# Patient Record
Sex: FEMALE | Race: WHITE | Employment: FULL TIME | URBAN - METROPOLITAN AREA
[De-identification: names, ages, dates, MRNs, and addresses within clinical notes are randomized per-mention and may not be internally consistent; named-entity substitution may affect disease eponyms.]

---

## 2019-04-18 PROCEDURE — 96375 TX/PRO/DX INJ NEW DRUG ADDON: CPT

## 2019-04-18 PROCEDURE — 96361 HYDRATE IV INFUSION ADD-ON: CPT

## 2019-04-18 PROCEDURE — 96365 THER/PROPH/DIAG IV INF INIT: CPT | Mod: 59

## 2019-04-18 PROCEDURE — 99285 EMERGENCY DEPT VISIT HI MDM: CPT | Mod: 25

## 2019-04-19 ENCOUNTER — HOSPITAL ENCOUNTER (OUTPATIENT)
Facility: CLINIC | Age: 34
Setting detail: OBSERVATION
Discharge: HOME | End: 2019-04-20
Attending: EMERGENCY MEDICINE | Admitting: INTERNAL MEDICINE

## 2019-04-19 ENCOUNTER — APPOINTMENT (OUTPATIENT)
Dept: SURGERY | Facility: PHYSICIAN GROUP | Age: 34
End: 2019-04-19

## 2019-04-19 ENCOUNTER — APPOINTMENT (OUTPATIENT)
Dept: ULTRASOUND IMAGING | Facility: CLINIC | Age: 34
End: 2019-04-19
Attending: EMERGENCY MEDICINE

## 2019-04-19 ENCOUNTER — ANESTHESIA EVENT (OUTPATIENT)
Dept: SURGERY | Facility: CLINIC | Age: 34
End: 2019-04-19

## 2019-04-19 ENCOUNTER — ANESTHESIA (OUTPATIENT)
Dept: SURGERY | Facility: CLINIC | Age: 34
End: 2019-04-19

## 2019-04-19 ENCOUNTER — APPOINTMENT (OUTPATIENT)
Dept: GENERAL RADIOLOGY | Facility: CLINIC | Age: 34
End: 2019-04-19
Attending: EMERGENCY MEDICINE

## 2019-04-19 DIAGNOSIS — K81.0 ACUTE CHOLECYSTITIS: ICD-10-CM

## 2019-04-19 LAB
ALBUMIN SERPL-MCNC: 4 G/DL (ref 3.4–5)
ALBUMIN UR-MCNC: NEGATIVE MG/DL
ALP SERPL-CCNC: 59 U/L (ref 40–150)
ALT SERPL W P-5'-P-CCNC: 18 U/L (ref 0–50)
ANION GAP SERPL CALCULATED.3IONS-SCNC: 6 MMOL/L (ref 3–14)
APPEARANCE UR: ABNORMAL
AST SERPL W P-5'-P-CCNC: 13 U/L (ref 0–45)
B-HCG SERPL-ACNC: <1 IU/L (ref 0–5)
BACTERIA #/AREA URNS HPF: ABNORMAL /HPF
BASOPHILS # BLD AUTO: 0.1 10E9/L (ref 0–0.2)
BASOPHILS NFR BLD AUTO: 0.5 %
BILIRUB SERPL-MCNC: 0.2 MG/DL (ref 0.2–1.3)
BILIRUB UR QL STRIP: NEGATIVE
BUN SERPL-MCNC: 10 MG/DL (ref 7–30)
CALCIUM SERPL-MCNC: 8.1 MG/DL (ref 8.5–10.1)
CHLORIDE SERPL-SCNC: 104 MMOL/L (ref 94–109)
CO2 SERPL-SCNC: 27 MMOL/L (ref 20–32)
COLOR UR AUTO: ABNORMAL
CREAT SERPL-MCNC: 0.78 MG/DL (ref 0.52–1.04)
DIFFERENTIAL METHOD BLD: ABNORMAL
EOSINOPHIL # BLD AUTO: 0.1 10E9/L (ref 0–0.7)
EOSINOPHIL NFR BLD AUTO: 0.8 %
ERYTHROCYTE [DISTWIDTH] IN BLOOD BY AUTOMATED COUNT: 12 % (ref 10–15)
GFR SERPL CREATININE-BSD FRML MDRD: >90 ML/MIN/{1.73_M2}
GLUCOSE SERPL-MCNC: 97 MG/DL (ref 70–99)
GLUCOSE UR STRIP-MCNC: NEGATIVE MG/DL
HCG UR QL: NEGATIVE
HCT VFR BLD AUTO: 42.4 % (ref 35–47)
HGB BLD-MCNC: 14.4 G/DL (ref 11.7–15.7)
HGB UR QL STRIP: NEGATIVE
IMM GRANULOCYTES # BLD: 0.1 10E9/L (ref 0–0.4)
IMM GRANULOCYTES NFR BLD: 0.5 %
KETONES UR STRIP-MCNC: NEGATIVE MG/DL
LEUKOCYTE ESTERASE UR QL STRIP: NEGATIVE
LIPASE SERPL-CCNC: 262 U/L (ref 73–393)
LYMPHOCYTES # BLD AUTO: 3.4 10E9/L (ref 0.8–5.3)
LYMPHOCYTES NFR BLD AUTO: 19.7 %
MCH RBC QN AUTO: 31.2 PG (ref 26.5–33)
MCHC RBC AUTO-ENTMCNC: 34 G/DL (ref 31.5–36.5)
MCV RBC AUTO: 92 FL (ref 78–100)
MONOCYTES # BLD AUTO: 0.7 10E9/L (ref 0–1.3)
MONOCYTES NFR BLD AUTO: 4.1 %
MUCOUS THREADS #/AREA URNS LPF: PRESENT /LPF
NEUTROPHILS # BLD AUTO: 12.7 10E9/L (ref 1.6–8.3)
NEUTROPHILS NFR BLD AUTO: 74.4 %
NITRATE UR QL: NEGATIVE
NRBC # BLD AUTO: 0 10*3/UL
NRBC BLD AUTO-RTO: 0 /100
PH UR STRIP: 7 PH (ref 5–7)
PLATELET # BLD AUTO: 422 10E9/L (ref 150–450)
POTASSIUM SERPL-SCNC: 4 MMOL/L (ref 3.4–5.3)
PROT SERPL-MCNC: 7.4 G/DL (ref 6.8–8.8)
RBC # BLD AUTO: 4.61 10E12/L (ref 3.8–5.2)
RBC #/AREA URNS AUTO: 2 /HPF (ref 0–2)
SODIUM SERPL-SCNC: 137 MMOL/L (ref 133–144)
SOURCE: ABNORMAL
SP GR UR STRIP: 1.02 (ref 1–1.03)
SQUAMOUS #/AREA URNS AUTO: 25 /HPF (ref 0–1)
UROBILINOGEN UR STRIP-MCNC: NORMAL MG/DL (ref 0–2)
WBC # BLD AUTO: 17.1 10E9/L (ref 4–11)
WBC #/AREA URNS AUTO: 4 /HPF (ref 0–5)

## 2019-04-19 PROCEDURE — 25000132 ZZH RX MED GY IP 250 OP 250 PS 637: Performed by: ANESTHESIOLOGY

## 2019-04-19 PROCEDURE — 81025 URINE PREGNANCY TEST: CPT | Performed by: EMERGENCY MEDICINE

## 2019-04-19 PROCEDURE — 80053 COMPREHEN METABOLIC PANEL: CPT | Performed by: EMERGENCY MEDICINE

## 2019-04-19 PROCEDURE — 25800030 ZZH RX IP 258 OP 636: Performed by: NURSE ANESTHETIST, CERTIFIED REGISTERED

## 2019-04-19 PROCEDURE — 84702 CHORIONIC GONADOTROPIN TEST: CPT | Performed by: EMERGENCY MEDICINE

## 2019-04-19 PROCEDURE — 25000125 ZZHC RX 250: Performed by: EMERGENCY MEDICINE

## 2019-04-19 PROCEDURE — 25000128 H RX IP 250 OP 636: Performed by: SURGERY

## 2019-04-19 PROCEDURE — 83690 ASSAY OF LIPASE: CPT | Performed by: EMERGENCY MEDICINE

## 2019-04-19 PROCEDURE — 25000128 H RX IP 250 OP 636: Performed by: NURSE ANESTHETIST, CERTIFIED REGISTERED

## 2019-04-19 PROCEDURE — 81001 URINALYSIS AUTO W/SCOPE: CPT | Performed by: EMERGENCY MEDICINE

## 2019-04-19 PROCEDURE — 99220 ZZC INITIAL OBSERVATION CARE,LEVL III: CPT | Performed by: INTERNAL MEDICINE

## 2019-04-19 PROCEDURE — 85025 COMPLETE CBC W/AUTO DIFF WBC: CPT | Performed by: EMERGENCY MEDICINE

## 2019-04-19 PROCEDURE — 88304 TISSUE EXAM BY PATHOLOGIST: CPT | Performed by: SURGERY

## 2019-04-19 PROCEDURE — 25000128 H RX IP 250 OP 636: Performed by: EMERGENCY MEDICINE

## 2019-04-19 PROCEDURE — 37000008 ZZH ANESTHESIA TECHNICAL FEE, 1ST 30 MIN: Performed by: SURGERY

## 2019-04-19 PROCEDURE — 25800030 ZZH RX IP 258 OP 636: Performed by: INTERNAL MEDICINE

## 2019-04-19 PROCEDURE — 71000027 ZZH RECOVERY PHASE 2 EACH 15 MINS: Performed by: SURGERY

## 2019-04-19 PROCEDURE — 25800030 ZZH RX IP 258 OP 636: Performed by: ANESTHESIOLOGY

## 2019-04-19 PROCEDURE — 99207 ZZC APP CREDIT; MD BILLING SHARED VISIT: CPT | Performed by: PHYSICIAN ASSISTANT

## 2019-04-19 PROCEDURE — 76705 ECHO EXAM OF ABDOMEN: CPT

## 2019-04-19 PROCEDURE — 96376 TX/PRO/DX INJ SAME DRUG ADON: CPT

## 2019-04-19 PROCEDURE — 25000128 H RX IP 250 OP 636: Performed by: ANESTHESIOLOGY

## 2019-04-19 PROCEDURE — 47562 LAPAROSCOPIC CHOLECYSTECTOMY: CPT | Performed by: SURGERY

## 2019-04-19 PROCEDURE — 37000009 ZZH ANESTHESIA TECHNICAL FEE, EACH ADDTL 15 MIN: Performed by: SURGERY

## 2019-04-19 PROCEDURE — 96366 THER/PROPH/DIAG IV INF ADDON: CPT

## 2019-04-19 PROCEDURE — 25000125 ZZHC RX 250: Performed by: NURSE ANESTHETIST, CERTIFIED REGISTERED

## 2019-04-19 PROCEDURE — 74018 RADEX ABDOMEN 1 VIEW: CPT

## 2019-04-19 PROCEDURE — 25000128 H RX IP 250 OP 636: Performed by: INTERNAL MEDICINE

## 2019-04-19 PROCEDURE — 25000125 ZZHC RX 250: Performed by: SURGERY

## 2019-04-19 PROCEDURE — 71000013 ZZH RECOVERY PHASE 1 LEVEL 1 EA ADDTL HR: Performed by: SURGERY

## 2019-04-19 PROCEDURE — 25000132 ZZH RX MED GY IP 250 OP 250 PS 637: Performed by: SURGERY

## 2019-04-19 PROCEDURE — G0378 HOSPITAL OBSERVATION PER HR: HCPCS

## 2019-04-19 PROCEDURE — 36000056 ZZH SURGERY LEVEL 3 1ST 30 MIN: Performed by: SURGERY

## 2019-04-19 PROCEDURE — 25800030 ZZH RX IP 258 OP 636: Performed by: PHYSICIAN ASSISTANT

## 2019-04-19 PROCEDURE — 47562 LAPAROSCOPIC CHOLECYSTECTOMY: CPT | Mod: AS | Performed by: PHYSICIAN ASSISTANT

## 2019-04-19 PROCEDURE — 40000306 ZZH STATISTIC PRE PROC ASSESS II: Performed by: SURGERY

## 2019-04-19 PROCEDURE — 25800025 ZZH RX 258: Performed by: SURGERY

## 2019-04-19 PROCEDURE — 27210794 ZZH OR GENERAL SUPPLY STERILE: Performed by: SURGERY

## 2019-04-19 PROCEDURE — 36000058 ZZH SURGERY LEVEL 3 EA 15 ADDTL MIN: Performed by: SURGERY

## 2019-04-19 PROCEDURE — 71000012 ZZH RECOVERY PHASE 1 LEVEL 1 FIRST HR: Performed by: SURGERY

## 2019-04-19 PROCEDURE — 25000132 ZZH RX MED GY IP 250 OP 250 PS 637: Performed by: EMERGENCY MEDICINE

## 2019-04-19 RX ORDER — CEFAZOLIN SODIUM 2 G/100ML
2 INJECTION, SOLUTION INTRAVENOUS
Status: DISCONTINUED | OUTPATIENT
Start: 2019-04-19 | End: 2019-04-19 | Stop reason: HOSPADM

## 2019-04-19 RX ORDER — HYDROMORPHONE HYDROCHLORIDE 1 MG/ML
0.5 INJECTION, SOLUTION INTRAMUSCULAR; INTRAVENOUS; SUBCUTANEOUS ONCE
Status: COMPLETED | OUTPATIENT
Start: 2019-04-19 | End: 2019-04-19

## 2019-04-19 RX ORDER — SODIUM CHLORIDE 9 MG/ML
INJECTION, SOLUTION INTRAVENOUS CONTINUOUS
Status: DISCONTINUED | OUTPATIENT
Start: 2019-04-19 | End: 2019-04-19

## 2019-04-19 RX ORDER — FENTANYL CITRATE 50 UG/ML
100 INJECTION, SOLUTION INTRAMUSCULAR; INTRAVENOUS ONCE
Status: COMPLETED | OUTPATIENT
Start: 2019-04-19 | End: 2019-04-19

## 2019-04-19 RX ORDER — TRAMADOL HYDROCHLORIDE 50 MG/1
50-100 TABLET ORAL EVERY 4 HOURS PRN
Status: DISCONTINUED | OUTPATIENT
Start: 2019-04-19 | End: 2019-04-20 | Stop reason: HOSPADM

## 2019-04-19 RX ORDER — KETOROLAC TROMETHAMINE 30 MG/ML
30 INJECTION, SOLUTION INTRAMUSCULAR; INTRAVENOUS EVERY 6 HOURS PRN
Status: DISCONTINUED | OUTPATIENT
Start: 2019-04-19 | End: 2019-04-19 | Stop reason: HOSPADM

## 2019-04-19 RX ORDER — DEXAMETHASONE SODIUM PHOSPHATE 4 MG/ML
INJECTION, SOLUTION INTRA-ARTICULAR; INTRALESIONAL; INTRAMUSCULAR; INTRAVENOUS; SOFT TISSUE PRN
Status: DISCONTINUED | OUTPATIENT
Start: 2019-04-19 | End: 2019-04-19

## 2019-04-19 RX ORDER — TRAMADOL HYDROCHLORIDE 50 MG/1
50 TABLET ORAL ONCE
Status: COMPLETED | OUTPATIENT
Start: 2019-04-19 | End: 2019-04-19

## 2019-04-19 RX ORDER — AMPICILLIN AND SULBACTAM 2; 1 G/1; G/1
3 INJECTION, POWDER, FOR SOLUTION INTRAMUSCULAR; INTRAVENOUS EVERY 6 HOURS
Status: DISCONTINUED | OUTPATIENT
Start: 2019-04-19 | End: 2019-04-20 | Stop reason: HOSPADM

## 2019-04-19 RX ORDER — LIDOCAINE 40 MG/G
CREAM TOPICAL
Status: DISCONTINUED | OUTPATIENT
Start: 2019-04-19 | End: 2019-04-19 | Stop reason: HOSPADM

## 2019-04-19 RX ORDER — ONDANSETRON 2 MG/ML
4 INJECTION INTRAMUSCULAR; INTRAVENOUS EVERY 6 HOURS PRN
Status: DISCONTINUED | OUTPATIENT
Start: 2019-04-19 | End: 2019-04-20 | Stop reason: HOSPADM

## 2019-04-19 RX ORDER — HYDRALAZINE HYDROCHLORIDE 20 MG/ML
2.5-5 INJECTION INTRAMUSCULAR; INTRAVENOUS EVERY 10 MIN PRN
Status: DISCONTINUED | OUTPATIENT
Start: 2019-04-19 | End: 2019-04-19 | Stop reason: HOSPADM

## 2019-04-19 RX ORDER — ONDANSETRON 4 MG/1
4 TABLET, ORALLY DISINTEGRATING ORAL EVERY 30 MIN PRN
Status: DISCONTINUED | OUTPATIENT
Start: 2019-04-19 | End: 2019-04-19 | Stop reason: HOSPADM

## 2019-04-19 RX ORDER — ACETAMINOPHEN 650 MG/1
650 SUPPOSITORY RECTAL EVERY 4 HOURS PRN
Status: DISCONTINUED | OUTPATIENT
Start: 2019-04-19 | End: 2019-04-20 | Stop reason: HOSPADM

## 2019-04-19 RX ORDER — ONDANSETRON 2 MG/ML
4 INJECTION INTRAMUSCULAR; INTRAVENOUS ONCE
Status: COMPLETED | OUTPATIENT
Start: 2019-04-19 | End: 2019-04-19

## 2019-04-19 RX ORDER — HYDROXYZINE HYDROCHLORIDE 10 MG/1
10 TABLET, FILM COATED ORAL ONCE
Status: COMPLETED | OUTPATIENT
Start: 2019-04-19 | End: 2019-04-19

## 2019-04-19 RX ORDER — NEOSTIGMINE METHYLSULFATE 1 MG/ML
VIAL (ML) INJECTION PRN
Status: DISCONTINUED | OUTPATIENT
Start: 2019-04-19 | End: 2019-04-19

## 2019-04-19 RX ORDER — NALOXONE HYDROCHLORIDE 0.4 MG/ML
.1-.4 INJECTION, SOLUTION INTRAMUSCULAR; INTRAVENOUS; SUBCUTANEOUS
Status: DISCONTINUED | OUTPATIENT
Start: 2019-04-19 | End: 2019-04-20 | Stop reason: HOSPADM

## 2019-04-19 RX ORDER — TRAMADOL HYDROCHLORIDE 50 MG/1
50 TABLET ORAL EVERY 6 HOURS PRN
Qty: 20 TABLET | Refills: 0 | Status: SHIPPED | OUTPATIENT
Start: 2019-04-19 | End: 2019-04-22

## 2019-04-19 RX ORDER — DIMENHYDRINATE 50 MG/ML
25 INJECTION, SOLUTION INTRAMUSCULAR; INTRAVENOUS
Status: DISCONTINUED | OUTPATIENT
Start: 2019-04-19 | End: 2019-04-19 | Stop reason: HOSPADM

## 2019-04-19 RX ORDER — ACETAMINOPHEN 325 MG/1
650 TABLET ORAL EVERY 4 HOURS PRN
Status: DISCONTINUED | OUTPATIENT
Start: 2019-04-19 | End: 2019-04-20 | Stop reason: HOSPADM

## 2019-04-19 RX ORDER — BUPIVACAINE HYDROCHLORIDE AND EPINEPHRINE 5; 5 MG/ML; UG/ML
INJECTION, SOLUTION PERINEURAL PRN
Status: DISCONTINUED | OUTPATIENT
Start: 2019-04-19 | End: 2019-04-19 | Stop reason: HOSPADM

## 2019-04-19 RX ORDER — GLYCOPYRROLATE 0.2 MG/ML
INJECTION, SOLUTION INTRAMUSCULAR; INTRAVENOUS PRN
Status: DISCONTINUED | OUTPATIENT
Start: 2019-04-19 | End: 2019-04-19

## 2019-04-19 RX ORDER — HYDROMORPHONE HYDROCHLORIDE 1 MG/ML
0.5 INJECTION, SOLUTION INTRAMUSCULAR; INTRAVENOUS; SUBCUTANEOUS
Status: DISCONTINUED | OUTPATIENT
Start: 2019-04-19 | End: 2019-04-20 | Stop reason: HOSPADM

## 2019-04-19 RX ORDER — METOCLOPRAMIDE HYDROCHLORIDE 5 MG/ML
10 INJECTION INTRAMUSCULAR; INTRAVENOUS EVERY 6 HOURS PRN
Status: DISCONTINUED | OUTPATIENT
Start: 2019-04-19 | End: 2019-04-19 | Stop reason: HOSPADM

## 2019-04-19 RX ORDER — FENTANYL CITRATE 50 UG/ML
25-50 INJECTION, SOLUTION INTRAMUSCULAR; INTRAVENOUS
Status: DISCONTINUED | OUTPATIENT
Start: 2019-04-19 | End: 2019-04-19 | Stop reason: HOSPADM

## 2019-04-19 RX ORDER — LIDOCAINE HYDROCHLORIDE 10 MG/ML
INJECTION, SOLUTION INFILTRATION; PERINEURAL PRN
Status: DISCONTINUED | OUTPATIENT
Start: 2019-04-19 | End: 2019-04-19

## 2019-04-19 RX ORDER — MEPERIDINE HYDROCHLORIDE 50 MG/ML
12.5 INJECTION INTRAMUSCULAR; INTRAVENOUS; SUBCUTANEOUS
Status: DISCONTINUED | OUTPATIENT
Start: 2019-04-19 | End: 2019-04-19 | Stop reason: HOSPADM

## 2019-04-19 RX ORDER — ONDANSETRON 2 MG/ML
INJECTION INTRAMUSCULAR; INTRAVENOUS PRN
Status: DISCONTINUED | OUTPATIENT
Start: 2019-04-19 | End: 2019-04-19

## 2019-04-19 RX ORDER — AMPICILLIN AND SULBACTAM 2; 1 G/1; G/1
3 INJECTION, POWDER, FOR SOLUTION INTRAMUSCULAR; INTRAVENOUS EVERY 6 HOURS
Status: DISCONTINUED | OUTPATIENT
Start: 2019-04-19 | End: 2019-04-19

## 2019-04-19 RX ORDER — FENTANYL CITRATE 50 UG/ML
INJECTION, SOLUTION INTRAMUSCULAR; INTRAVENOUS PRN
Status: DISCONTINUED | OUTPATIENT
Start: 2019-04-19 | End: 2019-04-19

## 2019-04-19 RX ORDER — PROPOFOL 10 MG/ML
INJECTION, EMULSION INTRAVENOUS PRN
Status: DISCONTINUED | OUTPATIENT
Start: 2019-04-19 | End: 2019-04-19

## 2019-04-19 RX ORDER — ACETAMINOPHEN 325 MG/1
650 TABLET ORAL
Status: DISCONTINUED | OUTPATIENT
Start: 2019-04-19 | End: 2019-04-19

## 2019-04-19 RX ORDER — SODIUM CHLORIDE 9 MG/ML
1000 INJECTION, SOLUTION INTRAVENOUS CONTINUOUS
Status: DISCONTINUED | OUTPATIENT
Start: 2019-04-19 | End: 2019-04-19

## 2019-04-19 RX ORDER — LABETALOL 20 MG/4 ML (5 MG/ML) INTRAVENOUS SYRINGE
10
Status: DISCONTINUED | OUTPATIENT
Start: 2019-04-19 | End: 2019-04-19 | Stop reason: HOSPADM

## 2019-04-19 RX ORDER — SODIUM CHLORIDE, SODIUM LACTATE, POTASSIUM CHLORIDE, CALCIUM CHLORIDE 600; 310; 30; 20 MG/100ML; MG/100ML; MG/100ML; MG/100ML
INJECTION, SOLUTION INTRAVENOUS CONTINUOUS
Status: DISCONTINUED | OUTPATIENT
Start: 2019-04-19 | End: 2019-04-19 | Stop reason: HOSPADM

## 2019-04-19 RX ORDER — AMPICILLIN AND SULBACTAM 2; 1 G/1; G/1
3 INJECTION, POWDER, FOR SOLUTION INTRAMUSCULAR; INTRAVENOUS ONCE
Status: COMPLETED | OUTPATIENT
Start: 2019-04-19 | End: 2019-04-19

## 2019-04-19 RX ORDER — AMOXICILLIN 250 MG
1-2 CAPSULE ORAL 2 TIMES DAILY
Qty: 30 TABLET | Refills: 0 | Status: SHIPPED | OUTPATIENT
Start: 2019-04-19

## 2019-04-19 RX ORDER — SODIUM CHLORIDE 9 MG/ML
INJECTION, SOLUTION INTRAVENOUS CONTINUOUS
Status: DISCONTINUED | OUTPATIENT
Start: 2019-04-20 | End: 2019-04-20 | Stop reason: HOSPADM

## 2019-04-19 RX ORDER — ONDANSETRON 4 MG/1
4 TABLET, ORALLY DISINTEGRATING ORAL EVERY 6 HOURS PRN
Status: DISCONTINUED | OUTPATIENT
Start: 2019-04-19 | End: 2019-04-20 | Stop reason: HOSPADM

## 2019-04-19 RX ORDER — ONDANSETRON 2 MG/ML
4 INJECTION INTRAMUSCULAR; INTRAVENOUS EVERY 30 MIN PRN
Status: DISCONTINUED | OUTPATIENT
Start: 2019-04-19 | End: 2019-04-19 | Stop reason: HOSPADM

## 2019-04-19 RX ORDER — DEXAMETHASONE SODIUM PHOSPHATE 4 MG/ML
4 INJECTION, SOLUTION INTRA-ARTICULAR; INTRALESIONAL; INTRAMUSCULAR; INTRAVENOUS; SOFT TISSUE EVERY 10 MIN PRN
Status: DISCONTINUED | OUTPATIENT
Start: 2019-04-19 | End: 2019-04-19 | Stop reason: HOSPADM

## 2019-04-19 RX ORDER — CEFAZOLIN SODIUM 1 G/50ML
1 INJECTION, SOLUTION INTRAVENOUS SEE ADMIN INSTRUCTIONS
Status: DISCONTINUED | OUTPATIENT
Start: 2019-04-19 | End: 2019-04-19 | Stop reason: HOSPADM

## 2019-04-19 RX ORDER — METOCLOPRAMIDE 10 MG/1
10 TABLET ORAL EVERY 6 HOURS PRN
Status: DISCONTINUED | OUTPATIENT
Start: 2019-04-19 | End: 2019-04-19 | Stop reason: HOSPADM

## 2019-04-19 RX ORDER — SODIUM CHLORIDE, SODIUM LACTATE, POTASSIUM CHLORIDE, CALCIUM CHLORIDE 600; 310; 30; 20 MG/100ML; MG/100ML; MG/100ML; MG/100ML
INJECTION, SOLUTION INTRAVENOUS CONTINUOUS PRN
Status: DISCONTINUED | OUTPATIENT
Start: 2019-04-19 | End: 2019-04-19

## 2019-04-19 RX ORDER — HYDROMORPHONE HYDROCHLORIDE 1 MG/ML
.3-.5 INJECTION, SOLUTION INTRAMUSCULAR; INTRAVENOUS; SUBCUTANEOUS EVERY 10 MIN PRN
Status: DISCONTINUED | OUTPATIENT
Start: 2019-04-19 | End: 2019-04-19 | Stop reason: HOSPADM

## 2019-04-19 RX ORDER — NALOXONE HYDROCHLORIDE 0.4 MG/ML
.1-.4 INJECTION, SOLUTION INTRAMUSCULAR; INTRAVENOUS; SUBCUTANEOUS
Status: DISCONTINUED | OUTPATIENT
Start: 2019-04-19 | End: 2019-04-19 | Stop reason: HOSPADM

## 2019-04-19 RX ADMIN — SODIUM CHLORIDE: 9 INJECTION, SOLUTION INTRAVENOUS at 04:11

## 2019-04-19 RX ADMIN — FENTANYL CITRATE 50 MCG: 50 INJECTION INTRAMUSCULAR; INTRAVENOUS at 13:46

## 2019-04-19 RX ADMIN — FAMOTIDINE 20 MG: 10 INJECTION, SOLUTION INTRAVENOUS at 01:11

## 2019-04-19 RX ADMIN — LIDOCAINE HYDROCHLORIDE 30 ML: 20 SOLUTION ORAL; TOPICAL at 01:16

## 2019-04-19 RX ADMIN — Medication 0.5 MG: at 03:16

## 2019-04-19 RX ADMIN — Medication 0.5 MG: at 04:31

## 2019-04-19 RX ADMIN — SODIUM CHLORIDE, POTASSIUM CHLORIDE, SODIUM LACTATE AND CALCIUM CHLORIDE: 600; 310; 30; 20 INJECTION, SOLUTION INTRAVENOUS at 11:13

## 2019-04-19 RX ADMIN — FENTANYL CITRATE 100 MCG: 50 INJECTION, SOLUTION INTRAMUSCULAR; INTRAVENOUS at 11:03

## 2019-04-19 RX ADMIN — SODIUM CHLORIDE 1000 ML: 9 INJECTION, SOLUTION INTRAVENOUS at 00:57

## 2019-04-19 RX ADMIN — AMPICILLIN SODIUM AND SULBACTAM SODIUM 3 G: 2; 1 INJECTION, POWDER, FOR SOLUTION INTRAMUSCULAR; INTRAVENOUS at 03:16

## 2019-04-19 RX ADMIN — DEXAMETHASONE SODIUM PHOSPHATE 4 MG: 4 INJECTION, SOLUTION INTRA-ARTICULAR; INTRALESIONAL; INTRAMUSCULAR; INTRAVENOUS; SOFT TISSUE at 11:22

## 2019-04-19 RX ADMIN — SODIUM CHLORIDE, POTASSIUM CHLORIDE, SODIUM LACTATE AND CALCIUM CHLORIDE: 600; 310; 30; 20 INJECTION, SOLUTION INTRAVENOUS at 14:02

## 2019-04-19 RX ADMIN — HYDROMORPHONE HYDROCHLORIDE 1 MG: 1 INJECTION, SOLUTION INTRAMUSCULAR; INTRAVENOUS; SUBCUTANEOUS at 11:32

## 2019-04-19 RX ADMIN — LIDOCAINE HYDROCHLORIDE 50 MG: 10 INJECTION, SOLUTION INFILTRATION; PERINEURAL at 11:22

## 2019-04-19 RX ADMIN — PROCHLORPERAZINE EDISYLATE 10 MG: 5 INJECTION INTRAMUSCULAR; INTRAVENOUS at 14:09

## 2019-04-19 RX ADMIN — TRAMADOL HYDROCHLORIDE 50 MG: 50 TABLET, COATED ORAL at 14:02

## 2019-04-19 RX ADMIN — ONDANSETRON 4 MG: 2 INJECTION INTRAMUSCULAR; INTRAVENOUS at 12:16

## 2019-04-19 RX ADMIN — ROCURONIUM BROMIDE 50 MG: 10 INJECTION INTRAVENOUS at 11:22

## 2019-04-19 RX ADMIN — Medication 3 MG: at 12:23

## 2019-04-19 RX ADMIN — GLYCOPYRROLATE 0.4 MG: 0.2 INJECTION, SOLUTION INTRAMUSCULAR; INTRAVENOUS at 12:23

## 2019-04-19 RX ADMIN — HYDROXYZINE HYDROCHLORIDE 10 MG: 10 TABLET ORAL at 14:38

## 2019-04-19 RX ADMIN — PROPOFOL 170 MG: 10 INJECTION, EMULSION INTRAVENOUS at 11:22

## 2019-04-19 RX ADMIN — FENTANYL CITRATE 100 MCG: 50 INJECTION, SOLUTION INTRAMUSCULAR; INTRAVENOUS at 11:22

## 2019-04-19 RX ADMIN — TRAMADOL HYDROCHLORIDE 50 MG: 50 TABLET, COATED ORAL at 21:28

## 2019-04-19 RX ADMIN — ACETAMINOPHEN 650 MG: 325 TABLET, FILM COATED ORAL at 21:28

## 2019-04-19 RX ADMIN — AMPICILLIN AND SULBACTAM 3 G: 2; 1 INJECTION, POWDER, FOR SOLUTION INTRAMUSCULAR; INTRAVENOUS at 19:01

## 2019-04-19 RX ADMIN — AMPICILLIN AND SULBACTAM 3 G: 2; 1 INJECTION, POWDER, FOR SOLUTION INTRAMUSCULAR; INTRAVENOUS at 23:45

## 2019-04-19 RX ADMIN — AMPICILLIN AND SULBACTAM 3 G: 2; 1 INJECTION, POWDER, FOR SOLUTION INTRAMUSCULAR; INTRAVENOUS at 09:01

## 2019-04-19 RX ADMIN — FENTANYL CITRATE 50 MCG: 50 INJECTION, SOLUTION INTRAMUSCULAR; INTRAVENOUS at 11:49

## 2019-04-19 RX ADMIN — Medication 0.5 MG: at 13:09

## 2019-04-19 RX ADMIN — ONDANSETRON 4 MG: 2 INJECTION INTRAMUSCULAR; INTRAVENOUS at 13:18

## 2019-04-19 RX ADMIN — GLYCOPYRROLATE 0.2 MG: 0.2 INJECTION, SOLUTION INTRAMUSCULAR; INTRAVENOUS at 11:22

## 2019-04-19 RX ADMIN — ONDANSETRON 4 MG: 2 INJECTION INTRAMUSCULAR; INTRAVENOUS at 01:11

## 2019-04-19 RX ADMIN — FENTANYL CITRATE 50 MCG: 50 INJECTION INTRAMUSCULAR; INTRAVENOUS at 13:32

## 2019-04-19 RX ADMIN — SODIUM CHLORIDE: 900 INJECTION INTRAVENOUS at 23:44

## 2019-04-19 RX ADMIN — Medication 0.5 MG: at 09:01

## 2019-04-19 ASSESSMENT — ENCOUNTER SYMPTOMS
CONSTIPATION: 1
DYSURIA: 0
DIARRHEA: 0
ABDOMINAL PAIN: 1
BACK PAIN: 1
FEVER: 0
NAUSEA: 1
VOMITING: 1

## 2019-04-19 ASSESSMENT — MIFFLIN-ST. JEOR
SCORE: 1316.89
SCORE: 1317.35
SCORE: 1319.16

## 2019-04-19 NOTE — CONSULTS
General Surgery Consultation    Nikkie Vivas MRN# [id number]   Age: 33 year old YOB: 1985     Date of Admission:  4/19/2019    Reason for consult:            Cholecystitis       Requesting physician:            Dr Radha Starkey                Assessment and Plan:   Assessment:   Nikkie Vivas is a 33 year old healthy female with acute cholecystitis. Multiple days of epigastric pain, n/v, ultrasound with a large nonmobile stone in the gallbladder neck and leukocytosis to 17,000 and normal LFTs      Plan:   I have offered the patient a laparoscopic cholecystectomy, today.    We have discussed the indication, alternatives, risks and expected recovery.  Specifically we have discussed incisions, scarring, postoperative infections, anesthesia, bleeding, blood transfusion, open conversion, common bile duct injury, injury to intra-abdominal organs, adhesions leading to bowel obstruction, retained common bile duct stone, bile leak, DVT, PE, hernia, post cholecystectomy diarrhea, recovery, postoperative dietary restrictions and physical limitations.  We have discussed the recommended interventions and treatments for these complications.  All questions have been answered to the best of my ability.    She elects to proceed with surgery.   Will plan to discharge to home after surgery today unless complications arise.                 Chief Complaint:   Cholecystitis     History is obtained from the patient.         History of Present Illness:   Nikkie Vivas is a 33 year old  female who presents with abdominal pain for multiple days. Pain is in the epigastrum with radiation to the midback and is constant. Slowly worsening over the last few days. Has been nauseated and vomited. Eating makes it worse. No fevers, but has felt hot and dizzy. Tried taking alkaselzer, peptobismol and tylenol at home with no relief. Has had very mild epigastric pain in the past she doesn't relate to eating or anything  "specific, but nothing similar to this.   Recently moved here from [place], currently lives with her dad.            Past Medical History:   Depression          Past Surgical History:   Tonsillectomy          Social History:     Social History     Tobacco Use     Smoking status: Never Smoker     Smokeless tobacco: Never Used   Substance Use Topics     Alcohol use: Not on file             Family History:   History reviewed. No pertinent family history.  No family history of bleeding or clotting disorders.         Allergies:     Allergies   Allergen Reactions     Doxycycline Nausea and Vomiting             Medications:     No current facility-administered medications on file prior to encounter.   No current outpatient medications on file prior to encounter.    ampicillin-sulbactam (UNASYN) IV  3 g Intravenous Q6H            Review of Systems:   The 10 point review of systems is negative other than noted in the HPI.          Physical Exam:   /41 (BP Location: Right arm)   Pulse 90   Temp 96.3  F (35.7  C) (Oral)   Resp 16   Ht 1.626 m (5' 4\")   Wt 61.7 kg (136 lb 1.6 oz)   SpO2 99%   BMI 23.36 kg/m    General - Well developed, well nourished female in no apparent distress  HEENT:  Head normocephalic and atraumatic, pupils equal and round, conjunctivae clear, no scleral icterus, mucous membranes dry, external ears and nose normal  Neck: Supple without thyromegaly or masses  Lymphatic: No cervical, or supraclavicular lymphadenopathy  Lungs: Clear to auscultation bilaterally  Heart: regular rate and rhythm, no murmurs  Abdomen: thin, soft, nondistended, tender especially in the RUQ. Pain in the RUQ with palpation of rest of abdomen. No masses  Extremities: Warm without edema  Neurologic: nonfocal  Psychiatric: Mood and affect appropriate  Skin: Without lesions, rashes, or jaundice         Data:     WBC -   Lab Results   Component Value Date    WBC 17.1 (H) 04/19/2019       HgB -   Lab Results   Component Value " Date    HGB 14.4 04/19/2019       Plt-   Lab Results   Component Value Date     04/19/2019       Liver Function Studies -   Recent Labs   Lab Test 04/19/19  0058   PROTTOTAL 7.4   ALBUMIN 4.0   BILITOTAL 0.2   ALKPHOS 59   AST 13   ALT 18       Lipase-   Lab Results   Component Value Date    LIPASE 262 04/19/2019         Imaging:  All imaging studies reviewed by me.    Results for orders placed or performed during the hospital encounter of 04/19/19   Abdomen XR 1 vw    Narrative    XR ABDOMEN 1 VW  4/19/2019 2:33 AM     HISTORY: Vomiting, constipation.    COMPARISON: None.       Impression    IMPRESSION: Supine abdomen. The bowel gas pattern is unremarkable.  There is a large amount of stool in the ascending through descending  colon. Small calcification projects over the left kidney. IUD in the  midpelvis.    RAYMON CORNEJO MD   Abdomen US, limited (RUQ only)    Narrative    US ABDOMEN LIMITED  4/19/2019 2:25 AM      HISTORY: Right upper quadrant pain, vomiting.     COMPARISON: None.    FINDINGS: The liver is normal in size and texture without focal mass.  There is no intra or extrahepatic biliary dilatation. The common  hepatic duct measures 0.5 cm. There is a single large stone in the  gallbladder neck measuring approximately 1.9 x 2.4 cm. The gallbladder  wall is mildly thickened at 0.4 cm. There is a positive sonographic  Ring's sign. The pancreas head and body appear normal. The tail is  obscured by bowel gas. The right kidney measures 11.6 cm and is normal  in appearance. The proximal abdominal aorta and IVC appear normal.       Impression    IMPRESSION:  1. There is a 2.4 cm stone in the gallbladder neck. The gallbladder  wall is thickened and there is a positive sonographic Ring's sign.  These findings suggest acute cholecystitis.  2. No biliary dilatation.    RAYMON CORNEJO MD         Time spent with the patient, reviewing the EMR, reviewing laboratory and imaging studies, more than 50% of  which was counseling and coordinating care:  30 minutes.     Cortney Cowart MD

## 2019-04-19 NOTE — PROGRESS NOTES
Capnography placed at 1520.  Pt on room air.  EtCO2 36 and IPI 8-9.  Pt attempted to wear for 5 minutes but was extremely irritated by cannula and removed, refusing to wear it.  Continuous sat monitor in place with oxygen saturation > 95% on room air and respirations >10 per minute.

## 2019-04-19 NOTE — H&P
Admitted:     04/19/2019      CHIEF COMPLAINT:  Abdominal pain, nausea, vomiting.      HISTORY OF PRESENT ILLNESS:  This is a 33-year-old white female with no significant past medical history who presents with abdominal pain in her epigastric region.  It had started a couple of days ago.  However, initially it was mild.  However, it has progressively been getting worse and now radiating to her back.  She had significant pain today after eating a meal.  She has also had some nausea and emesis which has been nonbloody with this pain.  She denies any fevers or chills.  In the ER over here, she was seen by Dr. America Burton.  I discussed care with her.  I am asked to admit her for further evaluation.      PAST MEDICAL HISTORY:  Extensive, reviewed and noncontributory.      PAST SURGICAL HISTORY:  Significant for tonsillectomy.      MEDICATIONS:  None.      FAMILY HISTORY:  Reviewed and noncontributory.      SOCIAL HISTORY:  She smokes 1 pack every 3-4 days.  Seldom drinks alcohol.      ALLERGIES:  SHE IS ALLERGIC TO DOXYCYCLINE.      REVIEW OF SYSTEMS:  As mentioned in the HPI.  She denies any shortness of breath.  All other systems extensively reviewed and deemed unremarkable and negative.  The pain comes in waves, though she has a constant pain all the time.      PHYSICAL EXAMINATION:   VITAL SIGNS:  Here, temperature is 98.2, pulse 83, blood pressure is 104/54, respiratory rate 18, O2 sat is 98% on room air.   GENERAL:  She is alert, awake, oriented, coherent, in mild distress secondary to abdominal pain.   HEENT:  Pupils equal, round, react to light.  Pharynx, there is no exudate noted.   LUNGS:  Clear to auscultation.   HEART:  Regular rate, S1, S2 normal.  No murmurs or gallops.   ABDOMEN:  Soft, tender in the epigastric region and right upper quadrant with positive Ring size sign.  Positive bowel sounds.  No guarding or rigidity.   EXTREMITIES:  There is no edema.      LABORATORY:  Labwork obtained included a  CMP which is grossly within normal limits.  Serum hCG is less than 1.  Lipase is within normal limits.  On a CBC, her white cell count is 17.1 with an absolute neutrophil count of 12.7.  A urinalysis does not show any evidence of a UTI.  She had an x-ray of her abdomen which showed IUD in the mid pelvis.  Large amount of stool in the ascending through the descending colon.  Bowel gas patten is unremarkable.  Ultrasound of her abdomen showed a 2.4 cm stone in the gallbladder neck.  The gallbladder wall is thickened and there is positive sonographic Ring sign to suggest findings suggest acute cholecystitis.  No biliary dilatation.      ASSESSMENT AND PLAN:    1.  Acute cholecystitis.  We will admit her under observation status.  She has been initiated on IV Unasyn, which I will continue.  We will keep her n.p.o. with IV fluids, IV analgesia, IV antiemetics.  We will consult General Surgery for consideration of laparoscopic cholecystectomy.   2.  CODE STATUS:  Full code.   3.  She will be admitted under observation status.         ASHLEY BEEBE MD             D: 2019   T: 2019   MT: GREGORY      Name:     CASSIE BOURNE   MRN:      [id number]        Account:      [id number]   :      1985        Admitted:     2019                   Document: [id number]

## 2019-04-19 NOTE — DISCHARGE INSTRUCTIONS
HOME CARE FOLLOWING LAPAROSCOPIC CHOLECYSTECTOMY  LIZZIE Allen, MEETA Lee R. O Donnell, J. Shaheen    INCISIONAL CARE:  Replace the bandage over your incisions until all drainage stops, or if more comfortable to have in place.  If present, leave the steri-strips (white paper tapes) in place for 14 days after surgery.  If Dermabond (a type of skin glue) is present, leave in place until it wears/flakes off.      BATHING:  Avoid baths for 1 week after surgery.  Showers are okay.  You may wash your hair at any time.  Gently pat your incisions dry after bathing.    ACTIVITY:  Light Activity -- you may immediately be up and about as tolerated.  Driving -- you may drive when comfortable and off narcotic pain medications.  Light Work -- resume when comfortable off pain medications.  (If you can drive, you probably can work.)  Strenuous Work/Activity -- limit lifting to 20 pounds for 2 weeks.  Progressively increase with time.  Active Sports (running, biking, etc.) -- cautiously resume after 2 weeks.    DISCOMFORT:  For the first 72 hours after surgery, take the following (can be obtained over the counter)  - Ibuprofen (motrin) 400-600mg every 6 hours (max 2,400mg per day)   - Tylenol (acetaminophen) 500mg every 6 hours (max 3,000mg per day)  - Take with food if GI upset occurs  - If additional pain medication is needed, take the narcotic that you were prescribed.  - After 72 hours, take the above pain medication only as needed.    Use pain medications as prescribed by your surgeon.  Take the pain medication with some food, when possible, to minimize side effects.  Intermittent use of ice packs at the incision sites may help during the first 48 hours.  Expect gradual improvement.  You may experience shoulder pain, which is due to the air placed within your abdomen during the procedure.  This is temporary and usually passes within 2 days.    DIET:  Drink plenty of fluids.  While taking pain  medications, increase dietary fiber or add a fiber supplementation like Metamucil or Citrucel to help prevent constipation - a possible side effect of pain medications.  It is not uncommon to experience some bowel changes (loose stools or constipation) after surgery.  Your body has to adapt to you no longer having a gall bladder.  To help minimize this side effect, avoid fatty foods for the first week after surgery.  You may then slowly increase the amount of fatty foods in your diet.      NAUSEA:  If nauseated from the anesthetic/pain meds; rest in bed, get up cautiously with assistance, and drink clear liquids (juice, tea, broth).    RETURN APPOINTMENT:  Schedule a follow-up visit 2-3 weeks post-op.  Office Phone:  [phone]     CONTACT US IF THE FOLLOWING DEVELOPS:   1. A fever that is above 101     2. If there is a large amount of drainage, bleeding, or swelling.   3. Severe pain that is not relieved by your prescription.   4. Drainage that is thick, cloudy, yellow, green or white.   5. Any other questions not answered by  Frequently Asked Questions  sheet.      FREQUENTLY ASKED QUESTIONS:    Q:  How should my incision look?    A:  Normally your incision will appear slightly swollen with light redness directly along the incision itself as it heals.  It may feel like a bump or ridge as the healing/scarring happens, and over time (3-4 months) this bump or ridge feeling should slowly go away.  In general, clear or pink watery drainage can be normal at first as your incision heals, but should decrease over time.    Q:  How do I know if my incision is infected?  A:  Look at your incision for signs of infection, like redness around the incision spreading to surrounding skin, or drainage of cloudy or foul-smelling drainage.  If you feel warm, check your temperature to see if you are running a fever.    **If any of these things occur, please notify the nurse at our office.  We may need you to come into the office for  an incision check.      Q:  How do I take care of my incision?  A:  If you have a dressing in place - Starting the day after surgery, replace the dressing 1-2 times a day until there is no further drainage from the incision.  At that time, a dressing is no longer needed.  Try to minimize tape on the skin if irritation is occurring at the tape sites.  If you have significant irritation from tape on the skin, please call the office to discuss other method of dressing your incision.    Small pieces of tape called  steri-strips  may be present directly overlying your incision; these may be removed 10 days after surgery unless otherwise specified by your surgeon.  If these tapes start to loosen at the ends, you may trim them back until they fall off or are removed.    A:  If you had  Dermabond  tissue glue used as a dressing (this causes your incision to look shiny with a clear covering over it) - This type of dressing wears off with time and does not require more dressings over the top unless it is draining around the glue as it wears off.  Do not apply ointments or lotions over the incisions until the glue has completely worn off.    Q:  There is a piece of tape or a sticky  lead  still on my skin.  Can I remove this?  A:  Sometimes the sticky  leads  used for monitoring during surgery or for evaluation in the emergency department are not all removed while you are in the hospital.  These sometimes have a tab or metal dot on them.  You can easily remove these on your own, like taking off a band-aid.  If there is a gel substance under the  lead , simply wipe/clean it off with a washcloth or paper towel.      Q:  What can I do to minimize constipation (very hard stools, or lack of stools)?  A:  Stay well hydrated.  Increase your dietary fiber intake or take a fiber supplement -with plenty of water.  Walk around frequently.  You may consider an over-the-counter stool-softener.  Your Pharmacist can assist you with choosing  one that is stocked at your pharmacy.  Constipation is also one of the most common side effects of pain medication.  If you are using pain medication, be pro-active and try to PREVENT problems with constipation by taking the steps above BEFORE constipation becomes a problem.    Q:  What do I do if I need more pain medications?  A:  Call the office to receive refills.  Be aware that certain pain meds cannot be called into a pharmacy and actually require a paper prescription.  A change may be made in your pain med as you progress thru your recovery period or if you have side effects to certain meds.    --Pain meds are NOT refilled after 5pm on weekdays, and NOT AT ALL on the weekends, so please look ahead to prevent problems.      Q:  Why am I having a hard time sleeping now that I am at home?  A:  Many medications you receive while you are in the hospital can impact your sleep for a number of days after your surgery/hospitalization.  Decreased level of activity and naps during the day may also make sleeping at night difficult.  Try to minimize day-time naps, and get up frequently during the day to walk around your home during your recovery time.  Sleep aides may be of some help, but are not recommended for long-term use.      Q:  I am having some back discomfort.  What should I do?  A:  This may be related to certain positioning that was required for your surgery, extended periods of time in bed, or other changes in your overall activity level.  You may try ice, heat, acetaminophen, or ibuprofen to treat this temporarily.  Note that many pain medications have acetaminophen in them and would state this on the prescription bottle.  Be sure not to exceed the maximum of 4000mg per day of acetaminophen.     **If the pain you are having does not resolve, is severe, or is a flare of back pain you have had on other occasions prior to surgery, please contact your primary physician for further recommendations or for an  appointment to be examined at their office.    Q:  Why am I having headaches?  A:  Headaches can be caused by many things:  caffeine withdrawal, use of pain meds, dehydration, high blood pressure, lack of sleep, over-activity/exhaustion, flare-up of usual migraine headaches.  If you feel this is related to muscle tension (a band-like feeling around the head, or a pressure at the low-back of the head) you may try ice or heat to this area.  You may need to drink more fluids (try electrolyte drink like Gatorade), rest, or take your usual migraine medications.   **If your headaches do not resolve, worsen, are accompanied by other symptoms, or if your blood pressure is high, please call your primary physician for recommendation and/or examination.    Q:  I am unable to urinate.  What do I do?  A:  A small percentage of people can have difficulty urinating initially after surgery.  This includes being able to urinate only a very small amount at a time and feeling discomfort or pressure in the very low abdomen.  This is called  urinary retention , and is actually an urgent situation.  Proceed to your nearest Emergency department for evaluation (not an Urgent Care Center).  Sometimes the bladder does not work correctly after certain medications you receive during surgery, or related to certain procedures.  You may need to have a catheter placed until your bladder recovers.  When planning to go to an Emergency department, it may help to call the ER to let them know you are coming in for this problem after a surgery.  This may help you get in quicker to be evaluated.  **If you have symptoms of a urinary tract infection, please contact your primary physician for the proper evaluation and treatment.          If you have other questions, please call the office Monday thru Friday between 8am and 5pm to discuss with the nurse or physician assistant.  #[phone]    There is a surgeon ON CALL on weekday evenings and over the  weekend in case of urgent need only, and may be contacted at the same number.    If you are having an emergency, call 911 or proceed to your nearest emergency department.            GENERAL ANESTHESIA OR SEDATION ADULT DISCHARGE INSTRUCTIONS   SPECIAL PRECAUTIONS FOR 24 HOURS AFTER SURGERY    IT IS NOT UNUSUAL TO FEEL LIGHT-HEADED OR FAINT, UP TO 24 HOURS AFTER SURGERY OR WHILE TAKING PAIN MEDICATION.  IF YOU HAVE THESE SYMPTOMS; SIT FOR A FEW MINUTES BEFORE STANDING AND HAVE SOMEONE ASSIST YOU WHEN YOU GET UP TO WALK OR USE THE BATHROOM.    YOU SHOULD REST AND RELAX FOR THE NEXT 24 HOURS AND YOU MUST MAKE ARRANGEMENTS TO HAVE SOMEONE STAY WITH YOU FOR AT LEAST 24 HOURS AFTER YOUR DISCHARGE.  AVOID HAZARDOUS AND STRENUOUS ACTIVITIES.  DO NOT MAKE IMPORTANT DECISIONS FOR 24 HOURS.    DO NOT DRIVE ANY VEHICLE OR OPERATE MECHANICAL EQUIPMENT FOR 24 HOURS FOLLOWING THE END OF YOUR SURGERY.  EVEN THOUGH YOU MAY FEEL NORMAL, YOUR REACTIONS MAY BE AFFECTED BY THE MEDICATION YOU HAVE RECEIVED.    DO NOT DRINK ALCOHOLIC BEVERAGES FOR 24 HOURS FOLLOWING YOUR SURGERY.    DRINK CLEAR LIQUIDS (APPLE JUICE, GINGER ALE, 7-UP, BROTH, ETC.).  PROGRESS TO YOUR REGULAR DIET AS YOU FEEL ABLE.    YOU MAY HAVE A DRY MOUTH, A SORE THROAT, MUSCLES ACHES OR TROUBLE SLEEPING.  THESE SHOULD GO AWAY AFTER 24 HOURS.    CALL YOUR DOCTOR FOR ANY OF THE FOLLOWING:  SIGNS OF INFECTION (FEVER, GROWING TENDERNESS AT THE SURGERY SITE, A LARGE AMOUNT OF DRAINAGE OR BLEEDING, SEVERE PAIN, FOUL-SMELLING DRAINAGE, REDNESS OR SWELLING.    IT HAS BEEN OVER 8 TO 10 HOURS SINCE SURGERY AND YOU ARE STILL NOT ABLE TO URINATE (PASS WATER).

## 2019-04-19 NOTE — ED NOTES
[facility]  ED Nurse Handoff Report    Nikkie Vivas is a 33 year old female   ED Chief complaint: Abdominal Pain  . ED Diagnosis:   Final diagnoses:   Acute cholecystitis     Allergies: No Known Allergies    Code Status: not on file  Activity level - Baseline/Home:  Independent. Activity Level - Current:   Independent. Lift room needed: No. Bariatric: No   Needed: No   Isolation: No. Infection: Not Applicable.     Vital Signs:   Vitals:    04/18/19 2347 04/19/19 0200   BP: 129/83 104/54   Pulse: 92 83   Resp: 18    Temp: 98.2  F (36.8  C)    TempSrc: Temporal    SpO2: 100% 98%   Weight: 59 kg (130 lb)        Cardiac Rhythm:  ,      Pain level: 0-10 Pain Scale: 9  Patient confused: No. Patient Falls Risk: Yes.   Elimination Status: Has voided   Patient Report - Initial Complaint: abd pain. Focused Assessment: Abd pain    Tests Performed:   Labs Ordered and Resulted from Time of ED Arrival Up to the Time of Departure from the ED   CBC WITH PLATELETS DIFFERENTIAL - Abnormal; Notable for the following components:       Result Value    WBC 17.1 (*)     Absolute Neutrophil 12.7 (*)     All other components within normal limits   ROUTINE UA WITH MICROSCOPIC - Abnormal; Notable for the following components:    Bacteria Urine Moderate (*)     Squamous Epithelial /HPF Urine 25 (*)     Mucous Urine Present (*)     All other components within normal limits   COMPREHENSIVE METABOLIC PANEL - Abnormal; Notable for the following components:    Calcium 8.1 (*)     All other components within normal limits   LIPASE   HCG QUALITATIVE URINE   HCG QUANTITATIVE PREGNANCY     . Abnormal Results:   Abdomen XR 1 vw   Preliminary Result   IMPRESSION: Supine abdomen. The bowel gas pattern is unremarkable.   There is a large amount of stool in the ascending through descending   colon. Small calcification projects over the left kidney. IUD in the   midpelvis.      Abdomen US, limited (RUQ only)   Preliminary Result    IMPRESSION:   1. There is a 2.4 cm stone in the gallbladder neck. The gallbladder   wall is thickened and there is a positive sonographic Ring's sign.   These findings suggest acute cholecystitis.   2. No biliary dilatation.        .   Treatments provided: Pain meds, zofran  Family Comments: [employer] [facility] brochure/video discussed/provided to patient:  Yes  ED Medications:   Medications   0.9% sodium chloride BOLUS (1,000 mLs Intravenous New Bag 4/19/19 0057)     Followed by   sodium chloride 0.9% infusion (has no administration in time range)   ampicillin-sulbactam (UNASYN) 3 g vial to attach to  mL bag (3 g Intravenous New Bag 4/19/19 0316)   ondansetron (ZOFRAN) injection 4 mg (4 mg Intravenous Given 4/19/19 0111)   famotidine (PEPCID) injection 20 mg (20 mg Intravenous Given 4/19/19 0111)   lidocaine VISCOUS (XYLOCAINE) 2 % 15 mL, alum & mag hydroxide-simethicone (MYLANTA ES/MAALOX  ES) 15 mL GI Cocktail (30 mLs Oral Given 4/19/19 0116)   HYDROmorphone (PF) (DILAUDID) injection 0.5 mg (0.5 mg Intravenous Given 4/19/19 0316)     Drips infusing:  No  For the majority of the shift, the patient's behavior Green. Interventions performed were N/A.     Severe Sepsis OR Septic Shock Diagnosis Present: No      ED Nurse Name/Phone Number: Aris Henderson,   3:31 AM    RECEIVING UNIT ED HANDOFF REVIEW    Above ED Nurse Handoff Report was reviewed: Yes  Reviewed by: Hina Saldaña on April 19, 2019 at 3:39 AM

## 2019-04-19 NOTE — PLAN OF CARE
PRIMARY DIAGNOSIS: BILIARY COLIC/UNCOMPLICATED EARLY ACUTE CHOLECYSTITIS  OUTPATIENT/OBSERVATION GOALS TO BE MET BEFORE DISCHARGE:    1. Pain status: Improved but still requiring IV narcotics.  2. Stable vital signs and labs (if performed) at disposition: Yes  3. Tolerating adequate PO diet: NPO with Ice   4. Successful cholecystectomy or clear follow up plan with General Surgery team if immediate surgery not performed Yes  5. ADLs back to baseline?  Yes  6. Activity and level of assistance: Ambulating independently.  7. Barriers to discharge noted Yes, surgery    Discharge Planner Nurse   Safe discharge environment identified: Yes  Barriers to discharge: Yes       Entered by: Jenise Martinez 04/19/2019 10:38 AM     Please review provider order for any additional goals.   Nurse to notify provider when observation goals have been met and patient is ready for discharge.    Pt is alert and oriented. Tearful this am. Dilaudid given for Abd pain 8/10. Pt denies nausea. NPO with ice.

## 2019-04-19 NOTE — LETTER
April 20, 2019      Nikkie Vivas  [address] [place] [id number]        To Whom It May Concern:    Nikkie Vivas was hospitalized 4/19/2019 - 4/20/2019.  Please excuse her from work.  She may return to work with no heavy lifting beginning 4/22/2019.    Sincerely,        No name on file.

## 2019-04-19 NOTE — ED PROVIDER NOTES
History     Chief Complaint:  Abdominal pain    HPI   Nikkie Vivas is a 33 year old female who presents with abdominal pain. The patient states hat for the last 2 days she has experienced constant epigastric abdominal pain which waxes and wanes. Tonight the pain intensified, which prompted her trip to the ED. The pain radiates to her back. She also reports constipation, nausea and a couple episodes of vomiting when the pain is severe. The patient denies any dysuria or fevers. She has had similar pain in the past associated with constipation.     Allergies:  No known allergies     Medications:    The patient is currently on no regular medications.    Past Medical History:    The patient denies any significant past medical history.    Past Surgical History:    The patient does not have any pertinent past surgical history.    Family History:    No past pertinent family history.    Social History:  Patient presents with family  Negative for tobacco use.  Negative for alcohol use.  Marital Status:        Review of Systems   Constitutional: Negative for fever.   Gastrointestinal: Positive for abdominal pain, constipation, nausea and vomiting. Negative for diarrhea.   Genitourinary: Negative for dysuria.   Musculoskeletal: Positive for back pain.   All other systems reviewed and are negative.      Physical Exam   First Vitals:  BP: 129/83  Pulse: 92  Temp: 98.2  F (36.8  C)  Resp: 18  Weight: 59 kg (130 lb)  SpO2: 100 %    Physical Exam  General: Patient is alert and interactive when I enter the room, uncomfortable appearing   Head:  The scalp, face, and head appear normal  Eyes:  Conjunctivae are normal  ENT:    The nose is normal    Pinnae are normal    External acoustic canals are normal  Neck:  Trachea midline  CV:  Pulses are normal.    Resp:  No respiratory distress   Abdomen:      Soft, epigastric and RUQ abdominal tenderness, non-distended  Musc:  Normal muscular tone    No major joint effusions    No  asymmetric leg swelling  Skin:  No rash or lesions noted  Neuro:  Speech is normal and fluent. Face is symmetric.     Moving all extremities well.   Psych: Awake. Alert.  Normal affect.  Appropriate interactions.      Emergency Department Course   Imaging:  Radiographic findings were communicated with the patient who voiced understanding of the findings.  X-ray abdomen, 1 view  Supine abdomen. The bowel gas pattern is unremarkable.  There is a large amount of stool in the ascending through descending  colon. Small calcification projects over the left kidney. IUD in the  midpelvis.  Result per radiology.    Abdomen US, RUQ only  1. There is a 2.4 cm stone in the gallbladder neck. The gallbladder  wall is thickened and there is a positive sonographic Ring's sign.  These findings suggest acute cholecystitis.  2. No biliary dilatation.  Per radiology    Laboratory:  HCG: negative  UA with micro: Bacteria: moderate, squamous epithelial: 25 (H), Mucous: present o/w negative  Lipase: 262  CMP:Calcium: 8.1 (L), o/w WNL (Creatinine: 0.78) (glucose: 97)    Interventions:  0057 - NS 1L IV  0111 - Zofran 4 mg IV  0111 - Pepcid 20 mg iV  0116 - GI Cocktail (Maalox/Mylanta and viscous Lidocaine), 30 mL suspension, PO   0316 - Unasyn 3 g IV  0316 - Dilaudid 0.5 mg IV    Emergency Department Course:  Nursing notes and vitals reviewed.  0051: I performed an exam of the patient as documented above.     0250: Findings and plan explained to the Patient who consents to admission.     0307: Discussed the patient with Dr. Starkey, who will admit the patient to a medical bed for further monitoring, evaluation, and treatment.     Impression & Plan    Medical Decision Making:  Nikkie Vivas is a 33 year old female who presents with abdominal pain.  The workup in the Emergency Room is concerning for acute cholecystitis.  Antibiotics have been started and the patient will be admitted to the medicine service for further evaluation and  treatment with a likely surgical consultation.There is no evidence at this point of serious complications of cholecystitis such as gangrenous cholecystitis, septic shock, etc.  No signs of other complications such as CBD stone, ascending cholangitis, gallstone pancreatitis.  Given constellation of symptoms, lab data and ultrasound I doubt GERD, gastritis, PUD, perforated ulcer, diverticulitis, colitis.      Diagnosis:    ICD-10-CM    1. Acute cholecystitis K81.0          I, Wisam Garcia, am serving as a scribe on 4/19/2019 at 12:51 AM to personally document services performed by America Burton MD based on my observations and the provider's statements to me.     Wisam Garcia  4/18/2019   [facility] EMERGENCY DEPARTMENT       America Burton MD  04/19/19 0419

## 2019-04-19 NOTE — PROGRESS NOTES
ROOM # 233    Living Situation (if not independent, order SW consult): Home with dad and family  Facility name:  : [facility]- [phone]    Activity level at baseline: Independent   Activity level on admit: Standby       Patient registered to observation; given Patient Bill of Rights; given the opportunity to ask questions about observation status and their plan of care.  Patient has been oriented to the observation room, bathroom and call light is in place.    Discussed discharge goals and expectations with patient/family.

## 2019-04-19 NOTE — OP NOTE
[facility] General Surgery Operative Note    Pre-operative diagnosis: acute cholecystitis   Post-operative diagnosis: same   Procedure: laparoscopic cholecystectomy   Surgeon: Cortney Cowart MD   Assistant(s): Nae Uribe PA-C  The Physician Assistant was medically necessary for their expertise in prepping, camera management, suctioning, suturing and retraction.   Anesthesia: general   Estimated blood loss:  Specimen: 5 cc  gallbladder and contents               DESCRIPTION OF PROCEDURE:  The patient was taken to the operating room and placed on the table in supine position.  General endotracheal anesthesia was induced and the abdomen was prepped and draped in standard sterile fashion.  An incision below the umbilicus was made with a blade.  The incision was carried down to the fascia. The fascia was elevated with kocher clamps and the fascia was incised in the midline with a blade.  The peritoneum was entered sharply.  A figure of eight 0 Vicryl suture was placed in the fascia.  The Briseyda trocar was introduced and the abdomen was insufflated with CO2.  A 5 mm trocar was placed in the subxiphoid position.  A 5 mm trocar was placed in the right upper quadrant, just below the costal margin at the midclavicular line.  Another was placed at the anterior axillary line just below the costal margin on the right.  The patient was placed in reverse Trendelenburg and right side up.  The gallbladder appeared edematous and with omental adhesions.  The fundus of the gallbladder was grasped and retracted cephalad. Omental adhesions were taken down with cautery easily.  The infundibulum was grasped and retracted laterally.  The peritoneum over the medial and lateral aspects of the triangle of Calot was taken down with the [place] dissector and modest amounts of Bovie electrocautery.  The cystic duct and artery were freed up from surrounding tissues.  The triangle of Calot was skeletonized revealing the critical view  of safety.  There was a small amount of edema.    The cystic artery and duct were each clipped twice proximally, once distally and transected with the scissors.  The gallbladder was then removed from the liver using the hook electrocautery.  The gallbladder was passed into an Endocatch bag and removed through the umbilical trocar site.  The umbilical fascia had to be enlarged slightly to remove the gallbladder due a large stone. We observed the right upper quadrant carefully for hemostasis.  Hemostasis was assured. The trocar sites were anesthetized with local anesthetic.  Each of the trocars was removed under direct visualization.  There was no bleeding from any of these sites.  The Briseyda trocar was removed and the abdomen was evacuated of CO2. The previously placed 0 vicryl suture in the fascia was tied down. An additional 0 Vicryl was placed to close the enlarged fascial opening. The skin of the umbilical incision was anesthetized with local anesthetic.  All of the incisions were closed with interrupted 4-0 Vicryl subcuticular sutures and Steri-Strips.  The patient tolerated the procedure well.  Sponge and instrument counts were correct.      FINDINGS: acute cholecystitis, large stone    Cortney Cowart MD

## 2019-04-19 NOTE — ANESTHESIA CARE TRANSFER NOTE
Patient: Nikkie Vivas    Procedure(s):  LAPAROSCOPIC CHOLECYSTECTOMY    Diagnosis: gallbladder  Diagnosis Additional Information: No value filed.    Anesthesia Type:   General, ETT     Note:  Anesthesia Care Transfer Notewriter    Vitals: (Last set prior to Anesthesia Care Transfer)    CRNA VITALS  4/19/2019 1209 - 4/19/2019 1244      4/19/2019             Pulse:  72    SpO2:  100 %    Resp Rate (observed):  14                Electronically Signed By: ANISHA Cotter CRNA  April 19, 2019  12:44 PM

## 2019-04-19 NOTE — ANESTHESIA CARE TRANSFER NOTE
Patient: Nikkie Vivas    Procedure(s):  LAPAROSCOPIC CHOLECYSTECTOMY    Diagnosis: gallbladder  Diagnosis Additional Information: No value filed.    Anesthesia Type:   General, ETT     Note:    Patient transferred to:PACU  Comments: Pt spont resps oral airway suctioned ETT removed to PACU VSS Report to RNHandoff Report: Identifed the Patient, Identified the Reponsible Provider, Reviewed the pertinent medical history, Discussed the surgical course, Reviewed Intra-OP anesthesia mangement and issues during anesthesia, Set expectations for post-procedure period and Allowed opportunity for questions and acknowledgement of understanding      Vitals: (Last set prior to Anesthesia Care Transfer)    CRNA VITALS  4/19/2019 1209 - 4/19/2019 1244      4/19/2019             Pulse:  72    SpO2:  100 %    Resp Rate (observed):  14                Electronically Signed By: ANISHA Cotter CRNA  April 19, 2019  12:44 PM

## 2019-04-19 NOTE — ANESTHESIA POSTPROCEDURE EVALUATION
Patient: Nikkie Vivas    Procedure(s):  LAPAROSCOPIC CHOLECYSTECTOMY    Diagnosis:gallbladder  Diagnosis Additional Information: Diagnosis: gallbladder stone    Anesthesia Type:  General, ETT    Note:  Anesthesia Post Evaluation    Patient location during evaluation: PACU  Patient participation: Able to fully participate in evaluation  Level of consciousness: awake and alert  Pain management: adequate  Airway patency: patent  Cardiovascular status: acceptable  Respiratory status: acceptable  Hydration status: acceptable  PONV: controlled     Anesthetic complications: None          Last vitals:  Vitals:    04/19/19 1415 04/19/19 1420 04/19/19 1425   BP:  134/85    Pulse:      Resp:   14   Temp:  99.3  F (37.4  C)    SpO2: 98% 100%          Electronically Signed By: Roger Lowe MD  April 19, 2019  3:11 PM

## 2019-04-19 NOTE — DISCHARGE SUMMARY
[facility] Outpatient / Observation Unit  Discharge Summary        Nikkie Vivas MRN# [id number]   YOB: 1985 Age: 33 year old     Date of Admission:  4/19/2019  Date of Discharge:  4/19/2019  Admitting Physician:  Radha Starkey MD  Discharge Physician: Dania Pat PA-C  Discharging Service: Hospitalist      Primary Provider: No Ref-Primary, Physician  Primary Care Physician Phone Number: None         Primary Discharge Diagnoses:    Nikkie Vivas was admitted on 4/19/2019 for intractable biliary colic/acute cholecystitis.     1. Intractable biliary colic/acute cholecystitis  2. S/p cholecystectomy          Secondary Discharge Diagnoses:   History reviewed. No pertinent past medical history.             Code Status:      Full Code        Brief Hospital Summary:        Reason for your hospital stay      Acute cholecystitis (gallbladder inflammation/infection)                          Significant Lab During Hospitalization:      Recent Labs   Lab 04/19/19  0058   WBC 17.1*   HGB 14.4   HCT 42.4   MCV 92        Recent Labs   Lab 04/19/19  0058      POTASSIUM 4.0   CHLORIDE 104   CO2 27   ANIONGAP 6   GLC 97   BUN 10   CR 0.78   GFRESTIMATED >90   GFRESTBLACK >90   ARISTIDES 8.1*   PROTTOTAL 7.4   ALBUMIN 4.0   BILITOTAL 0.2   ALKPHOS 59   AST 13   ALT 18     Recent Labs   Lab 04/19/19  0058   LIPASE 262                Significant Imaging During Hospitalization:      Recent Results (from the past 24 hour(s))   Abdomen US, limited (RUQ only)    Narrative    US ABDOMEN LIMITED  4/19/2019 2:25 AM      HISTORY: Right upper quadrant pain, vomiting.     COMPARISON: None.    FINDINGS: The liver is normal in size and texture without focal mass.  There is no intra or extrahepatic biliary dilatation. The common  hepatic duct measures 0.5 cm. There is a single large stone in the  gallbladder neck measuring approximately 1.9 x 2.4 cm. The gallbladder  wall is mildly thickened at 0.4 cm. There is a  "positive sonographic  Ring's sign. The pancreas head and body appear normal. The tail is  obscured by bowel gas. The right kidney measures 11.6 cm and is normal  in appearance. The proximal abdominal aorta and IVC appear normal.       Impression    IMPRESSION:  1. There is a 2.4 cm stone in the gallbladder neck. The gallbladder  wall is thickened and there is a positive sonographic Ring's sign.  These findings suggest acute cholecystitis.  2. No biliary dilatation.    RAYMON CORNEJO MD   Abdomen XR 1 vw    Narrative    XR ABDOMEN 1 VW  4/19/2019 2:33 AM     HISTORY: Vomiting, constipation.    COMPARISON: None.       Impression    IMPRESSION: Supine abdomen. The bowel gas pattern is unremarkable.  There is a large amount of stool in the ascending through descending  colon. Small calcification projects over the left kidney. IUD in the  midpelvis.    RAYMON CORNEJO MD              Pending Results:        Unresulted Labs Ordered in the Past 30 Days of this Admission     No orders found from 2/18/2019 to 4/20/2019.              Consultations This Hospital Stay:      Consultation during this admission received from surgery         Discharge Instructions and Follow-Up:      Follow-up Appointments     Follow-up and recommended labs and tests       Follow up with surgery as recommended.                 Discharge Disposition:      Discharged to home         Discharge Medications:      There are no discharge medications for this patient.          Allergies:         Allergies   Allergen Reactions     Doxycycline Nausea and Vomiting           Condition and Physical on Discharge:      Discharge condition: Stable   Vitals: Blood pressure 102/41, pulse 90, temperature 96.3  F (35.7  C), temperature source Oral, resp. rate 16, height 1.626 m (5' 4\"), weight 61.7 kg (136 lb 1.6 oz), SpO2 99 %.  136 lbs 1.6 oz      GENERAL:  Comfortable.  PSYCH: pleasant, oriented, No acute distress.  HEENT:  PERRLA. Normal conjunctiva, normal " hearing, nasal mucosa and Oropharynx are normal.  NECK:  Supple, no neck vein distention, adenopathy or bruits, normal thyroid.  HEART:  Normal S1, S2 with no murmur, no pericardial rub, gallops or S3 or S4.  LUNGS:  Clear to auscultation, normal Respiratory effort. No wheezing, rales or ronchi.  ABDOMEN:  Soft, no hepatosplenomegaly, normal bowel sounds. Tender RUQ/Epigastric area  EXTREMITIES:  No pedal edema, +2 pulses bilateral and equal.  SKIN:  Dry to touch, No rash, wound or ulcerations.  NEUROLOGIC:  CN 2-12 grossly intact,sensation is intact with no focal deficits.

## 2019-04-19 NOTE — PHARMACY-ADMISSION MEDICATION HISTORY
Admission medication history interview status for this patient is complete. See [facility] admission navigator for allergy information, prior to admission medications and immunization status.     Medication history interview source(s):Patient  Medication history resources (including written lists, pill bottles, clinic record):None  Primary pharmacy:-    Changes made to PTA medication list:  Added: -  Deleted: -  Changed: -    Actions taken by pharmacist (provider contacted, etc):None     Additional medication history information:None    Medication reconciliation/reorder completed by provider prior to medication history? No    Do you take OTC medications (eg tylenol, ibuprofen, fish oil, eye/ear drops, etc)? no(Y/N)    For patients on insulin therapy: no (Y/N)  Lantus/levemir/NPH/Mix 70/30 dose:   (Y/N) (see Med list for doses)   Sliding scale Novolog Y/N  If Yes, do you have a baseline novolog pre-meal dose:  units with meals  Patients eat three meals a day:   Y/N    How many episodes of hypoglycemia do you have per week: _______  How many missed doses do you have per week: ______  How many times do you check your blood glucose per day: _______  Do you have a Continuous glucose monitor (CGM)   Y/N (remind pt that not approved for hospital use)   Any Barriers to therapy - Be specific :  cost of medications, comfortable with giving injections (if applicable), comfortable and confident with current diabetes regimen: Y/N ______________      Prior to Admission medications    Medication Sig Last Dose Taking? Auth Provider   senna-docusate (SENOKOT-S/PERICOLACE) 8.6-50 MG tablet Take 1-2 tablets by mouth 2 times daily Take while on oral narcotics to prevent or treat constipation.  Yes Cortney Cowart MD   traMADol (ULTRAM) 50 MG tablet Take 1 tablet (50 mg) by mouth every 6 hours as needed for severe pain  Yes Cortney Cowart MD

## 2019-04-19 NOTE — PROGRESS NOTES
Prescription medications, tramadol and senna, sent via secured tube to second floor obs at this time.  Obs notified via phone with secure code.

## 2019-04-19 NOTE — ANESTHESIA PREPROCEDURE EVALUATION
Anesthesia Pre-Procedure Evaluation    Patient: Nikkie Vivas   MRN: [id number] : 1985          Preoperative Diagnosis: gallbladder    Procedure(s):  LAPAROSCOPIC CHOLECYSTECTOMY    Past Medical History:   Diagnosis Date     Depressive disorder      Past Surgical History:   Procedure Laterality Date     ENT SURGERY      tonsilectomy     Anesthesia Evaluation     . Pt has had prior anesthetic. Type: General and MAC           ROS/MED HX    ENT/Pulmonary:  - neg pulmonary ROS     Neurologic:  - neg neurologic ROS     Cardiovascular:  - neg cardiovascular ROS       METS/Exercise Tolerance:     Hematologic: Comments: Lab Test        19          WBC          17.1*         HGB          14.4          MCV          92            PLT          422            Lab Test        198          NA           137           POTASSIUM    4.0           CHLORIDE     104           CO2          27            BUN          10            CR           0.78          ANIONGAP     6             ARISTIDES          8.1*          GLC          97                    Musculoskeletal:  - neg musculoskeletal ROS       GI/Hepatic:     (+) cholecystitis/cholelithiasis,       Renal/Genitourinary:  - ROS Renal section negative       Endo:  - neg endo ROS       Psychiatric:     (+) psychiatric history depression      Infectious Disease:  - neg infectious disease ROS       Malignancy:      - no malignancy   Other:    (+) No chance of pregnancy C-spine cleared: N/A, no H/O Chronic Pain,no other significant disability   - neg other ROS                      Physical Exam  Normal systems: cardiovascular, pulmonary and dental    Airway   Mallampati: II  TM distance: >3 FB  Neck ROM: full    Dental     Cardiovascular       Pulmonary             Lab Results   Component Value Date    WBC 17.1 (H) 2019    HGB 14.4 2019    HCT 42.4 2019     2019     2019     "POTASSIUM 4.0 04/19/2019    CHLORIDE 104 04/19/2019    CO2 27 04/19/2019    BUN 10 04/19/2019    CR 0.78 04/19/2019    GLC 97 04/19/2019    ARISTIDES 8.1 (L) 04/19/2019    ALBUMIN 4.0 04/19/2019    PROTTOTAL 7.4 04/19/2019    ALT 18 04/19/2019    AST 13 04/19/2019    ALKPHOS 59 04/19/2019    BILITOTAL 0.2 04/19/2019    LIPASE 262 04/19/2019    HCG Negative 04/19/2019       Preop Vitals  BP Readings from Last 3 Encounters:   04/19/19 107/83    Pulse Readings from Last 3 Encounters:   04/19/19 98      Resp Readings from Last 3 Encounters:   04/19/19 18    SpO2 Readings from Last 3 Encounters:   04/19/19 100%      Temp Readings from Last 1 Encounters:   04/19/19 98.4  F (36.9  C) (Temporal)    Ht Readings from Last 1 Encounters:   04/19/19 1.626 m (5' 4\")      Wt Readings from Last 1 Encounters:   04/19/19 61.7 kg (136 lb)    Estimated body mass index is 23.34 kg/m  as calculated from the following:    Height as of this encounter: 1.626 m (5' 4\").    Weight as of this encounter: 61.7 kg (136 lb).       Anesthesia Plan      History & Physical Review  History and physical reviewed and following examination; no interval change.    ASA Status:  2 .    NPO Status:  > 8 hours    Plan for General and ETT with Intravenous induction. Maintenance will be Balanced.    PONV prophylaxis:  Ondansetron (or other 5HT-3) and Dexamethasone or Solumedrol       Postoperative Care  Postoperative pain management:  .  Plan for postoperative opioid use.    Consents  Anesthetic plan, risks, benefits and alternatives discussed with:  Patient.  Use of blood products discussed: Yes.   Use of blood products discussed with Patient.  Consented to blood products.  .                 Roger Lowe MD                    .  "

## 2019-04-19 NOTE — ED TRIAGE NOTES
33-year-old female presents to the ER with complaints of epigastric pain for the last couple of days. States it has progressively gotten worse. Pt was belching at triage.

## 2019-04-19 NOTE — PLAN OF CARE
"PRIMARY DIAGNOSIS: Post Lap Maame  OUTPATIENT/OBSERVATION GOALS TO BE MET BEFORE DISCHARGE:  1. Stable vital signs Yes, on room air  2. Tolerating diet: Pt has not eaten yet, she is on a clear liquid diet to be advanced as tolerated   3. Pain controlled with oral pain medications:  Pt got oral meds in PACU and reports her pain is tolerable when she arrived to the observation unit   4. Voiding without difficulty:  Yes  5. Able to ambulate:  Yes  6. Provider specific discharge goals met:  Working on pain management     VSS on RA. Pt refusing capnography monitoring because it was irritating her nose. Pt reports pain is tolerable at 4/10 and when asked about nausea states \"I don't know how I feel.\" Pt voided down in PACU. Plan: see how pt tolerates diet, pain management, monitor    Discharge Planner Nurse   Safe discharge environment identified: Yes, home with father and daughter  Barriers to discharge: Yes, continued pain control and monitoring       Entered by: Lilian Gilbert 04/19/2019 5:44 PM     Please review provider order for any additional goals.   Nurse to notify provider when observation goals have been met and patient is ready for discharge.  "

## 2019-04-20 VITALS
HEART RATE: 71 BPM | BODY MASS INDEX: 23.31 KG/M2 | WEIGHT: 136.5 LBS | RESPIRATION RATE: 14 BRPM | OXYGEN SATURATION: 99 % | HEIGHT: 64 IN | SYSTOLIC BLOOD PRESSURE: 106 MMHG | TEMPERATURE: 96 F | DIASTOLIC BLOOD PRESSURE: 62 MMHG

## 2019-04-20 PROCEDURE — 25000128 H RX IP 250 OP 636: Performed by: SURGERY

## 2019-04-20 PROCEDURE — 96366 THER/PROPH/DIAG IV INF ADDON: CPT

## 2019-04-20 PROCEDURE — 99217 ZZC OBSERVATION CARE DISCHARGE: CPT | Performed by: PHYSICIAN ASSISTANT

## 2019-04-20 PROCEDURE — 25000132 ZZH RX MED GY IP 250 OP 250 PS 637: Performed by: SURGERY

## 2019-04-20 PROCEDURE — G0378 HOSPITAL OBSERVATION PER HR: HCPCS

## 2019-04-20 RX ADMIN — TRAMADOL HYDROCHLORIDE 100 MG: 50 TABLET, COATED ORAL at 07:53

## 2019-04-20 RX ADMIN — TRAMADOL HYDROCHLORIDE 50 MG: 50 TABLET, COATED ORAL at 03:14

## 2019-04-20 RX ADMIN — ACETAMINOPHEN 650 MG: 325 TABLET, FILM COATED ORAL at 06:19

## 2019-04-20 RX ADMIN — AMPICILLIN AND SULBACTAM 3 G: 2; 1 INJECTION, POWDER, FOR SOLUTION INTRAMUSCULAR; INTRAVENOUS at 06:09

## 2019-04-20 NOTE — PLAN OF CARE
PRIMARY DIAGNOSIS: Post Lap Maame     OUTPATIENT/OBSERVATION GOALS TO BE MET BEFORE DISCHARGE:     1. Stable vital signs Yes, on room air     2. Tolerating diet: yes     3. Pain controlled with oral pain medications:  Yes     4. Voiding without difficulty:  Yes     5. Able to ambulate:  Yes     6. Provider specific discharge goals met:  Working on pain management      Discharge Planner Nurse   Safe discharge environment identified: Yes, home with father and daughter  Barriers to discharge: no    VSS on RA. Pt voiding well. Pain managed with tylenol and tramadol. Pt tolerating clear liquids diet advanced to regular for breakfast. Dad will be here at 10 for transport     Please review provider order for any additional goals.   Nurse to notify provider when observation goals have been met and patient is ready for discharge.

## 2019-04-20 NOTE — PROGRESS NOTES
OBSERVATION patient END time: 0953    Patient's After Visit Summary was reviewed with patient and/or father.   Patient verbalized understanding of After Visit Summary, recommended follow up and was given an opportunity to ask questions.   Discharge medications sent home with patient/family: YES   Discharged with father

## 2019-04-20 NOTE — PLAN OF CARE
PRIMARY DIAGNOSIS: Post Lap Maame    OUTPATIENT/OBSERVATION GOALS TO BE MET BEFORE DISCHARGE:    1. Stable vital signs Yes, on room air    2. Tolerating diet: Pt has not eaten yet, she is on a clear liquid diet to be advanced as tolerated     3. Pain controlled with oral pain medications:  Pt still declining pain medication, states pain is tolerable, pt is at rest in bed    4. Voiding without difficulty:  Yes    5. Able to ambulate:  Yes    6. Provider specific discharge goals met:  Working on pain management      Discharge Planner Nurse   Safe discharge environment identified: Yes, home with father and daughter  Barriers to discharge: Yes, continued pain control and monitoring  VSS on RA. Pt voiding well. Pain managed with tylenol and tramadol. Pt tolerating clear liquids diet advanced to full liquid.  Plan: Patient will  tolerates diet, pain management, monitor    Please review provider order for any additional goals.   Nurse to notify provider when observation goals have been met and patient is ready for discharge.

## 2019-04-20 NOTE — PLAN OF CARE
PRIMARY DIAGNOSIS: Post Lap Maame    OUTPATIENT/OBSERVATION GOALS TO BE MET BEFORE DISCHARGE:    1. Stable vital signs Yes, on room air    2. Tolerating diet: Pt has not eaten yet, she is on a clear liquid diet to be advanced as tolerated     3. Pain controlled with oral pain medications:  Pt still declining pain medication, states pain is tolerable, pt is at rest in bed    4. Voiding without difficulty:  Yes    5. Able to ambulate:  Yes    6. Provider specific discharge goals met:  Working on pain management      VSS on RA. Pt has voided. Pain continues to be manageable without pain medication. Pt tolerating clear liquids and reports not having much of an appetite. Plan: see how pt tolerates diet, pain management, monitor     Discharge Planner Nurse   Safe discharge environment identified: Yes, home with father and daughter  Barriers to discharge: Yes, continued pain control and monitoring         Entered by: Lilian Gilbert 04/19/2019    Please review provider order for any additional goals.   Nurse to notify provider when observation goals have been met and patient is ready for discharge.

## 2019-04-20 NOTE — PROGRESS NOTES
"[facility]   General Surgery Progress Note           Assessment and Plan:   Assessment:   POD#1 s/p Procedure(s):  LAPAROSCOPIC CHOLECYSTECTOMY        Plan:   -discharge today  -Rx Ultram  -RTC 1-3 weeks for postop visit         Interval History:   C/o abd pain worse than expected, adequately controlled with Ultram.  Up walking in room, voiding ok.  Tolerating diet.           Physical Exam:   Blood pressure 106/62, pulse 71, temperature 96  F (35.6  C), temperature source Oral, resp. rate 14, height 1.626 m (5' 4\"), weight 61.9 kg (136 lb 8 oz), last menstrual period 03/19/2019, SpO2 99 %.    I/O last 3 completed shifts:  In: 2720 [P.O.:120; I.V.:2600]  Out: 205 [Urine:200; Blood:5]    Abdomen:   soft, non-distended, tenderness noted diffusely and normal bowel sounds   Inc(s) - dressings intact                Data:     Recent Labs   Lab 04/19/19  0058   HGB 14.4     Recent Labs   Lab 04/19/19  0058   AST 13   ALT 18   ALKPHOS 59   BILITOTAL 0.2     Recent Labs   Lab 04/19/19  0058   WBC 17.1*       Nae Uribe PA-C       "

## 2019-04-20 NOTE — PLAN OF CARE
PRIMARY DIAGNOSIS: Post Lap Maame    OUTPATIENT/OBSERVATION GOALS TO BE MET BEFORE DISCHARGE:    1. Stable vital signs Yes, on room air    2. Tolerating diet: Pt has not eaten yet, she is on a clear liquid diet to be advanced as tolerated     3. Pain controlled with oral pain medications:  Yes, had tramadol 50 mg for pain rated at 5/10    4. Voiding without difficulty:  Yes    5. Able to ambulate:  Yes    6. Provider specific discharge goals met:  Working on pain management      Discharge Planner Nurse   Safe discharge environment identified: Yes, home with father and daughter  Barriers to discharge: Yes, continued pain control and monitoring  VSS on RA. Pt voiding well. Pain managed with tylenol and tramadol. Pt tolerating clear liquids diet advanced to regular, bowel sounds hypoactive.  Plan: Patient will  tolerates diet, pain management, monitor    Please review provider order for any additional goals.   Nurse to notify provider when observation goals have been met and patient is ready for discharge.

## 2019-04-20 NOTE — DISCHARGE SUMMARY
[facility]  Hospitalist Discharge Summary       Date of Admission:  4/19/2019  Date of Discharge:  4/20/2019  Discharging Provider: Cary Linn PA-C      Discharge Diagnoses   Acute cholecystitis s/p lap darin    Follow-ups Needed After Discharge   Follow-up Appointments     Follow-up and recommended labs and tests       Follow up with surgery as recommended.             Unresulted Labs Ordered in the Past 30 Days of this Admission     Date and Time Order Name Status Description    4/19/2019 1218 Surgical pathology exam In process       These results will be followed up by surgery    Discharge Disposition   Discharged to home  Condition at discharge: Stable    Hospital Course   This is a 34 yo woman admitted with acute abdominal pain without significant laboratory abnormalities including normal LFTs.  Abdominal ultrasound demonstrated cholecystitis with stone in the gallbladder neck.  Proceeded to laparoscopic cholecystectomy without complications.  Tolerating liquid diet by time of discharge.  Pain controlled with tramadol, advised tapering as outpatient.     Consultations This Hospital Stay   SURGERY GENERAL IP CONSULT    Code Status   Full Code    Time Spent on this Encounter   I, Cary Linn, personally saw the patient today and spent less than or equal to 30 minutes discharging this patient.       Cary Linn PA-C  [facility]  ______________________________________________________________________    Physical Exam   Vital Signs: Temp: 96.8  F (36  C) Temp src: Oral BP: 97/58 Pulse: 71 Heart Rate: 67 Resp: 16 SpO2: 97 % O2 Device: None (Room air) Oxygen Delivery: 10 LPM  On room air saturating 90s at time of discharge  Weight: 136 lbs 8 oz     Constitutional: nontoxic, comfortable appearing woman resting in bed  Eyes: no icterus  HEENT: mucous membranes moist  Respiratory: clear bilaterally  Cardiovascular: RRR  GI: normoactive bowel sounds, soft, appropriately  tender, no guarding or rebound.  steristrips and dressings clean and dry without strikethrough.    Genitourinary: no catheter  Skin: several healing scars on face, extremities.   Musculoskeletal: normal muscle bulk and tone  Neurologic: nonfocal  Psychiatric: alert, oriented, appropriate           Primary Care Physician   Physician No Ref-Primary    Discharge Orders      Reason for your hospital stay    Acute cholecystitis (gallbladder inflammation/infection)     Follow-up and recommended labs and tests     Follow up with surgery as recommended.     Activity    Your activity upon discharge: activity as recommended by surgery     Full Code     Diet    Follow this diet upon discharge:   Clear liquid diet, advance to normal diet as tolerated.       Significant Results and Procedures   Most Recent 3 CBC's:  Recent Labs   Lab Test 04/19/19  0058   WBC 17.1*   HGB 14.4   MCV 92        Most Recent 3 BMP's:  Recent Labs   Lab Test 04/19/19  0058      POTASSIUM 4.0   CHLORIDE 104   CO2 27   BUN 10   CR 0.78   ANIONGAP 6   ARISTIDES 8.1*   GLC 97   ,   Results for orders placed or performed during the hospital encounter of 04/19/19   Abdomen XR 1 vw    Narrative    XR ABDOMEN 1 VW  4/19/2019 2:33 AM     HISTORY: Vomiting, constipation.    COMPARISON: None.       Impression    IMPRESSION: Supine abdomen. The bowel gas pattern is unremarkable.  There is a large amount of stool in the ascending through descending  colon. Small calcification projects over the left kidney. IUD in the  midpelvis.    RAYMON CORNEJO MD   Abdomen US, limited (RUQ only)    Narrative    US ABDOMEN LIMITED  4/19/2019 2:25 AM      HISTORY: Right upper quadrant pain, vomiting.     COMPARISON: None.    FINDINGS: The liver is normal in size and texture without focal mass.  There is no intra or extrahepatic biliary dilatation. The common  hepatic duct measures 0.5 cm. There is a single large stone in the  gallbladder neck measuring approximately 1.9 x  2.4 cm. The gallbladder  wall is mildly thickened at 0.4 cm. There is a positive sonographic  Ring's sign. The pancreas head and body appear normal. The tail is  obscured by bowel gas. The right kidney measures 11.6 cm and is normal  in appearance. The proximal abdominal aorta and IVC appear normal.       Impression    IMPRESSION:  1. There is a 2.4 cm stone in the gallbladder neck. The gallbladder  wall is thickened and there is a positive sonographic Ring's sign.  These findings suggest acute cholecystitis.  2. No biliary dilatation.    RAYMON CORNEJO MD       Discharge Medications   Current Discharge Medication List      START taking these medications    Details   senna-docusate (SENOKOT-S/PERICOLACE) 8.6-50 MG tablet Take 1-2 tablets by mouth 2 times daily Take while on oral narcotics to prevent or treat constipation.  Qty: 30 tablet, Refills: 0    Comments: While on narcotics  Associated Diagnoses: Acute cholecystitis      traMADol (ULTRAM) 50 MG tablet Take 1 tablet (50 mg) by mouth every 6 hours as needed for severe pain  Qty: 20 tablet, Refills: 0    Associated Diagnoses: Acute cholecystitis           Allergies   Allergies   Allergen Reactions     Doxycycline Nausea and Vomiting

## 2019-04-22 LAB — COPATH REPORT: NORMAL

## (undated) DEVICE — GLOVE PROTEXIS BLUE W/NEU-THERA 7.0  2D73EB70

## (undated) DEVICE — ESU PENCIL W/HOLSTER E2350H

## (undated) DEVICE — NDL 22GA 1.5"

## (undated) DEVICE — CLIP APPLIER ENDO 5MM M/L LIGAMAX EL5ML

## (undated) DEVICE — SU VICRYL 4-0 PS-2 18" UND J496H

## (undated) DEVICE — SOL NACL 0.9% IRRIG 3000ML BAG 2B7477

## (undated) DEVICE — ESU CORD MONOPOLAR 10'  E0510

## (undated) DEVICE — Device

## (undated) DEVICE — SU VICRYL 0 UR-6 27" J603H

## (undated) DEVICE — BLADE KNIFE SURG 11 371111

## (undated) DEVICE — ENDO TROCAR BLUNT TIP KII BALLOON 12X100MM C0R47

## (undated) DEVICE — GOWN LG DISP 9515

## (undated) DEVICE — LINEN POUCH DBL 5427

## (undated) DEVICE — ENDO TROCAR FIRST ENTRY KII FIOS Z-THRD 05X100MM CTF03

## (undated) DEVICE — BAG CLEAR TRASH 1.3M 39X33" P4040C

## (undated) DEVICE — ESU ELEC BLADE 2.75" COATED/INSULATED E1455

## (undated) DEVICE — ENDO POUCH UNIV RETRIEVAL SYSTEM INZII 10MM CD001

## (undated) DEVICE — SUCTION IRR STRYKERFLOW II W/TIP 250-070-520

## (undated) DEVICE — ENDO TROCAR SLEEVE KII Z-THREADED 05X100MM CTS02

## (undated) DEVICE — LINEN FULL SHEET 5511

## (undated) DEVICE — SUCTION CANISTER MEDIVAC LINER 3000ML W/LID 65651-530

## (undated) DEVICE — LINEN HALF SHEET 5512

## (undated) DEVICE — LINEN TOWEL PACK X5 5464

## (undated) DEVICE — GLOVE PROTEXIS MICRO 6.5  2D73PM65

## (undated) DEVICE — ESU GROUND PAD ADULT W/CORD E7507

## (undated) DEVICE — SOL NACL 0.9% IRRIG 1000ML BOTTLE 2F7124

## (undated) RX ORDER — LIDOCAINE HYDROCHLORIDE 10 MG/ML
INJECTION, SOLUTION EPIDURAL; INFILTRATION; INTRACAUDAL; PERINEURAL
Status: DISPENSED
Start: 2019-04-19

## (undated) RX ORDER — DEXAMETHASONE SODIUM PHOSPHATE 4 MG/ML
INJECTION, SOLUTION INTRA-ARTICULAR; INTRALESIONAL; INTRAMUSCULAR; INTRAVENOUS; SOFT TISSUE
Status: DISPENSED
Start: 2019-04-19

## (undated) RX ORDER — NEOSTIGMINE METHYLSULFATE 1 MG/ML
VIAL (ML) INJECTION
Status: DISPENSED
Start: 2019-04-19

## (undated) RX ORDER — HYDROMORPHONE HYDROCHLORIDE 1 MG/ML
INJECTION, SOLUTION INTRAMUSCULAR; INTRAVENOUS; SUBCUTANEOUS
Status: DISPENSED
Start: 2019-04-19

## (undated) RX ORDER — ONDANSETRON 2 MG/ML
INJECTION INTRAMUSCULAR; INTRAVENOUS
Status: DISPENSED
Start: 2019-04-19

## (undated) RX ORDER — METOCLOPRAMIDE HYDROCHLORIDE 5 MG/ML
INJECTION INTRAMUSCULAR; INTRAVENOUS
Status: DISPENSED
Start: 2019-04-19

## (undated) RX ORDER — HYDROXYZINE HYDROCHLORIDE 10 MG/1
TABLET, FILM COATED ORAL
Status: DISPENSED
Start: 2019-04-19

## (undated) RX ORDER — PROPOFOL 10 MG/ML
INJECTION, EMULSION INTRAVENOUS
Status: DISPENSED
Start: 2019-04-19

## (undated) RX ORDER — FENTANYL CITRATE 50 UG/ML
INJECTION, SOLUTION INTRAMUSCULAR; INTRAVENOUS
Status: DISPENSED
Start: 2019-04-19

## (undated) RX ORDER — BUPIVACAINE HYDROCHLORIDE AND EPINEPHRINE 5; 5 MG/ML; UG/ML
INJECTION, SOLUTION EPIDURAL; INTRACAUDAL; PERINEURAL
Status: DISPENSED
Start: 2019-04-19

## (undated) RX ORDER — GLYCOPYRROLATE 0.2 MG/ML
INJECTION INTRAMUSCULAR; INTRAVENOUS
Status: DISPENSED
Start: 2019-04-19

## (undated) RX ORDER — CEFAZOLIN SODIUM 2 G/100ML
INJECTION, SOLUTION INTRAVENOUS
Status: DISPENSED
Start: 2019-04-19

## (undated) RX ORDER — TRAMADOL HYDROCHLORIDE 50 MG/1
TABLET ORAL
Status: DISPENSED
Start: 2019-04-19